# Patient Record
Sex: FEMALE | Employment: OTHER | URBAN - METROPOLITAN AREA
[De-identification: names, ages, dates, MRNs, and addresses within clinical notes are randomized per-mention and may not be internally consistent; named-entity substitution may affect disease eponyms.]

---

## 2018-03-01 ENCOUNTER — OFFICE VISIT (OUTPATIENT)
Dept: URGENT CARE | Facility: CLINIC | Age: 68
End: 2018-03-01

## 2018-03-01 VITALS
WEIGHT: 110.25 LBS | RESPIRATION RATE: 18 BRPM | OXYGEN SATURATION: 99 % | TEMPERATURE: 100 F | HEART RATE: 97 BPM | DIASTOLIC BLOOD PRESSURE: 71 MMHG | BODY MASS INDEX: 22.23 KG/M2 | HEIGHT: 59 IN | SYSTOLIC BLOOD PRESSURE: 135 MMHG

## 2018-03-01 DIAGNOSIS — J10.1 INFLUENZA B: Primary | ICD-10-CM

## 2018-03-01 DIAGNOSIS — R50.9 FEVER, UNSPECIFIED FEVER CAUSE: ICD-10-CM

## 2018-03-01 LAB
CTP QC/QA: YES
FLUAV AG NPH QL: NEGATIVE
FLUBV AG NPH QL: POSITIVE

## 2018-03-01 PROCEDURE — 87804 INFLUENZA ASSAY W/OPTIC: CPT | Mod: QW,S$GLB,, | Performed by: NURSE PRACTITIONER

## 2018-03-01 PROCEDURE — 99203 OFFICE O/P NEW LOW 30 MIN: CPT | Mod: S$GLB,,, | Performed by: NURSE PRACTITIONER

## 2018-03-01 RX ORDER — OSELTAMIVIR PHOSPHATE 75 MG/1
75 CAPSULE ORAL 2 TIMES DAILY
Qty: 10 CAPSULE | Refills: 0 | Status: SHIPPED | OUTPATIENT
Start: 2018-03-01 | End: 2018-03-06

## 2018-03-01 RX ORDER — ACETAMINOPHEN 500 MG
1000 TABLET ORAL ONCE
Status: COMPLETED | OUTPATIENT
Start: 2018-03-01 | End: 2018-03-01

## 2018-03-01 RX ORDER — ONDANSETRON 4 MG/1
TABLET, ORALLY DISINTEGRATING ORAL
Qty: 10 TABLET | Refills: 0 | Status: SHIPPED | OUTPATIENT
Start: 2018-03-01

## 2018-03-01 RX ADMIN — Medication 1000 MG: at 07:03

## 2018-03-02 NOTE — PATIENT INSTRUCTIONS
Please drink plenty of fluids.  Please get plenty of rest.  Please return here or go to the Emergency Department for any concerns or worsening of condition.  Tamiflu prescription has been discussed and if prescribed, please take to completion unless you cannot tolerate the side effects.   If you were prescribed a narcotic medication, do not drive or operate heavy equipment or machinery while taking these medications.  If you do not have Hypertension or any history of palpitations, it is ok to take over the counter Sudafed or Mucinex D or Allegra-D or Claritin-D or Zyrtec-D.  If you do take one of the above, it is ok to combine that with plain over the counter Mucinex or Allegra or Claritin or Zyrtec.  If for example you are taking Zyrtec -D, you can combine that with Mucinex, but not Mucinex-D.  If you are taking Mucinex-D, you can combine that with plain Allegra or Claritin or Zyrtec.   If you do have Hypertension or palpitations, it is safe to take Coricidin HBP for relief of sinus symptoms.  If not allergic, please take over the counter Tylenol (Acetaminophen) and/or Motrin (Ibuprofen) as directed for control of pain and/or fever.  Please follow up with your primary care doctor or specialist as needed.    If you  smoke, please stop smoking.  Grippe (enfant)    La grippe est une maladie virale maru affecte les voies respiratoires lurdes poumons. Nidia diffère du rhume. Et est grandement contagieuse. La grippe peut se répandre dans l'air en toussant et en éternuant, ou par contact direct (en touchant la personne malade, puis en vous touchant les yeux, le kisha ou la bouche). La maladie débute un à trois jours après l'exposition et dure une à deux semaines.  Les symptômes incluent une fatigue extrême, de la fièvre, lurdes douleurs musculaires, lurdes maux de tête et une toux sèche et douloureuse. Les antibiotiques ne sont généralement pas nécessaires, à moins qu'une complication ne se manifeste (comme une infection à l'oreille  ou une pneumonie).  Soins à domicile :  · LIQUIDES : La fièvre augmente la perte d'eau du corps. Pour les nouveau-nés âgés de moins de 1 an, continuez à alimenter régulièrement (préparation ou au sein). Entre les alimentations, donnez de la solution orale de réhydratation (comme Pedialyte, Infalyte, Rehydralyte, que vous pouvez vous procurer dans les épiceries et les pharmacies sans ordonnance). Pour les enfants âgés de plus de 1 an, donnez beaucoup de liquides comme de l'eau, du jus, de l'eau gélifiée, du 7-Up, du soda au gingembre, de la limonade, du Ritesh-Aid ou lurdes glaces à l'eau.  · ALIMENTATION : Si votre enfant ne veut pas manger de nourriture solide, ne gustavo en donnez pas pendant quelques jours, tant qu'il ou justina boit beaucoup de liquides.  · ACTIVITÉ : Gardez votre enfant atteint de fièvre à la jose eduardo pour qu'il se repose ou joue paisiblement. Encouragez-le à faire lurdes siestes de manière fréquente. Votre enfant peut retourner en garde de jour ou à l'école quand la fièvre a disparu pendant au moins 24 heures et que l'enfant mange lyndsay et se sent mieux.  · SOMMEIL : Les périodes d'insomnie et d'irritabilité sont fréquentes. Un enfant congestionné dormira mieux avec la tête et la partie supérieure du corps élevées par lurdes oreillers, ou avec la tête du lit élevée par lurdes blocs de 6 pouces (10 cm). Un nouveau-né peut dormir dans un siège de voiture posé kings le lit.  · TOUX : Tousser est normal en demetrice de grippe. Un humidificateur à brume fraîche placé près du lit peut être utile. Il n'a pas été prouvé que les médicaments contre le rhume et contre la toux en vente mary sont plus utiles qu'un placébo (sirop sucré sans médicament). Cependant, ils peuvent produit lurdes effets secondaires sérieux, en particulier chez les nouveau-nés âgés de moins de 2 ans. Par conséquent, ne donnez pas de médicaments contre le rhume et contre la toux en vente mary à un enfant âgé de moins de 6 ans, à moins que votre médecin vous ait  spécifiquement conseillé de le faire. De même, n'exposez pas votre enfant à la fumée de cigarette. Kartik aggraverait sa toux.  · CONGESTION NASALE : Aspirez le contenu du kisha lurdes nouveau-nés avec une seringue dotée d'une poire en caoutchouc. Spruce d'aspirer, vous pouvez faire couler 2 à 3 gouttes d'eau salée (solution saline) pour le kisha dans chaque arleth afin d'éliminer les sécrétions. Les gouttes d'eau saline pour le kisha sont disponibles sans prescription. Vous pouvez en faire en ajoutant 1/4 de cuillère à thé de jayla de table dans 1 tasse d'eau.  · FIÈVRE : Utilisez de l'acétaminophène (Tylenol) pour contrôler la douleur, à moins qu'un autre médicament vous ait été prescrit. Avec les nouveau-nés âgés de plus de 6 mois, vous pouvez utiliser de l'ibuprofène (Motrin pour enfant) au lieu du Tylenol. [REMARQUE : si votre enfant a une maladie chronique du foie ou lurdes reins, ou s'il a déjà eu un ulcère à l'estomac ou un saignement gastro-intestinal, parlez avec votre médecin jeremie d'utiliser saqib médicaments.] (Ne jamais preet d'aspirine à une personne âgée de moins de 18 ans maru a de la fièvre. Kartik pourrait causer lurdes dommages graves au foie.)  Effectuez un suivi  comme indiqué par notre personnel.  Obtenez de l'aide médicale dans les plus brefs délais  si ce maru suit se produit :  · Fièvre de 100,4 °F (38 °C) orale ou 101,4 °F (38,5 °C) rectale, ou supérieure, sans amélioration après prise de médicament contre la fièvre  · Respiration rapide (6 semaines à 2 ans : plus de 45 respirations par minute; 3 à 6 ans : plus de 35 respirations par minute; 7 à 10 ans : plus de 35 respirations par minute; plus de 10 ans : plus de 25 respirations par minute)  · Maux d'oreille, douleur aux sinus, cou raide ou douloureux, maux de tête, diarrhée ou vomissement à répétition  · Comportement difficile, somnolence ou confusion inhabituels  · Pas de larmes pendant les pleurs, yeux « enfoncés » ou bouche sèche; pas de couches sèches  pendant 8 heures chez les nouveau-nés, production d'urine réduite chez les enfants plus âgés  · Apparition d'un rash  Date Last Reviewed: 12/23/2014  © 7735-5013 [employer]. [address] [place] [id number]. All rights reserved. This information is not intended as a substitute for professional medical care. Always follow your healthcare professional's instructions.        Maladie Respiratoire Virale [Adulte]  Vous avez un maladie lurdes voies respiratoires supérieures provoquée par un virus. Cette maladie est contagieuse pendant les premiers jours. Nidia se diffuse dans l'air par la toux et les éternuements ou par contact direct (toucher la personne malade et ensuite toucher malcolm yeux, votre kisha ou votre bouche). La plupart lurdes maladies virales disparaissent en 7 à 10 jours avec du repos et de simples remèdes jose eduardo. Parfois, la maladie peut durer plusieurs semaines. Les antibiotiques ne peuvent pas tuer un virus et ils ne sont généralement pas prescrits pour cette maladie.    Soins A Domicile :  1) Si les symptômes sont graves, reposez-vous à la jose eduardo les 2 à 3 premiers jours. Lorsque vous reprenez votre activité, ne vous fatiguez pas trop.  2) Évitez toute exposition à la fumée de cigarette (la vôtre ou celle lurdes autres).  3) Du Tylenol (acétaminophène) ou de l'ibuprofène (Advil, Motrin) vont soulager la fièvre, les douleurs musculaires et les céphalées. (Les personnes de moins de 18 ans maru ont de la fièvre ne devraient pas prendre d'aspirine car donnie risque de détériorer leur foie.)  4) Votre appétit peut être faible, c'est pourquoi une alimentation légère convient très lyndsay. Évitez toute déshydratation en buvant 6 à 8 verres de fluides par jour (eau, sodas, jus, thé, soupe, etc.). Les fluides supplémentaires contribuent à fluidifier les sécrétions dans le kisha et dans les poumons.  5) Les médicaments contre le rhume vendus sans ordonnance ne vont pas diminuer la durée de votre maladie, mais  ils peuvent contribuer à soulager les symptômes suivants : toux (Robitussin DM) ; mal de gorge (Chloraseptic en losanges ou en spray) ; congestion nasale et congestion lurdes sinus (Actifed, Sudafed, Chlortrimeton).  Realisez Le Suivi  auprès de votre médecin ou selon les instructions si votre état ne s'améliore pas la semaine prochaine.  Consultez Rapidement Un Medecin  si l'un lurdes symptômes suivants apparaît :  -- Toux avec beaucoup d'expectorations colorées (mucus) ou sang dans malcolm expectorations  -- Douleur thoracique, essoufflement, sifflement pendant la respiration ou difficultés respiratoires  -- Céphalées graves ; douleur au visage, dans le cou ou l'oreille  -- Fièvre de plus de 100,4º F (38,0º C) pendant plus de trois jours  -- Vous ne pouvez pas avaler en raison d'une douleur à la gorge  Date Last Reviewed: 9/13/2015  © 6454-9899 [employer]. [address] [place] [address], [place] [address]. All rights reserved. This information is not intended as a substitute for professional medical care. Always follow your healthcare professional's instructions.

## 2018-03-02 NOTE — PROGRESS NOTES
"Subjective:       Patient ID: Dolores Hameed is a 67 y.o. female.    Vitals:  height is 4' 11" (1.499 m) and weight is 50 kg (110 lb 3.7 oz). Her oral temperature is 99.7 °F (37.6 °C). Her blood pressure is 135/71 and her pulse is 97. Her respiration is 18 and oxygen saturation is 99%.     Chief Complaint: Fever    Pt here from out of town for c/o fever, headache, body aches x2 days.  Some sore throat and cough.  Mainly c/o fever and headache.  No neck pain.  She does not travel home until 3/11/18.      Fever    This is a new problem. The current episode started in the past 7 days (2 days ago). The problem occurs daily. The problem has been gradually worsening. Her temperature was unmeasured prior to arrival. Associated symptoms include coughing, headaches, muscle aches and a sore throat. Pertinent negatives include no abdominal pain, chest pain, congestion, diarrhea, ear pain, nausea, sleepiness, vomiting or wheezing. She has tried acetaminophen for the symptoms. The treatment provided mild relief.     Review of Systems   Constitution: Positive for fever. Negative for chills and malaise/fatigue.   HENT: Positive for sore throat. Negative for congestion, ear pain and hoarse voice.    Eyes: Negative for discharge and redness.   Cardiovascular: Negative for chest pain, dyspnea on exertion and leg swelling.   Respiratory: Positive for cough. Negative for shortness of breath, sputum production and wheezing.    Musculoskeletal: Negative for myalgias.   Gastrointestinal: Negative for abdominal pain, diarrhea, nausea and vomiting.   Neurological: Positive for headaches.       Objective:      Physical Exam   Constitutional: She is oriented to person, place, and time. She appears well-developed and well-nourished. She is cooperative.  Non-toxic appearance. She does not have a sickly appearance. She does not appear ill. No distress.   HENT:   Head: Normocephalic and atraumatic.   Right Ear: Hearing, tympanic membrane, " external ear and ear canal normal.   Left Ear: Hearing, tympanic membrane, external ear and ear canal normal.   Nose: Rhinorrhea (clear) present. No mucosal edema or nasal deformity. No epistaxis. Right sinus exhibits no maxillary sinus tenderness and no frontal sinus tenderness. Left sinus exhibits no maxillary sinus tenderness and no frontal sinus tenderness.   Mouth/Throat: Uvula is midline, oropharynx is clear and moist and mucous membranes are normal. No trismus in the jaw. Normal dentition. No uvula swelling. No oropharyngeal exudate, posterior oropharyngeal edema or posterior oropharyngeal erythema.   Eyes: Conjunctivae and lids are normal. No scleral icterus.   Sclera clear bilat   Neck: Trachea normal, full passive range of motion without pain and phonation normal. Neck supple. No neck rigidity.   Cardiovascular: Normal rate, regular rhythm, normal heart sounds and normal pulses.    Pulmonary/Chest: Effort normal and breath sounds normal. No respiratory distress.   Abdominal: Soft. Normal appearance and bowel sounds are normal. She exhibits no distension. There is no tenderness.   Musculoskeletal: Normal range of motion. She exhibits no edema or deformity.   Lymphadenopathy:     She has no cervical adenopathy.   Neurological: She is alert and oriented to person, place, and time. She exhibits normal muscle tone. Coordination normal.   Skin: Skin is warm, dry and intact. She is not diaphoretic. No pallor.   Psychiatric: She has a normal mood and affect. Her speech is normal and behavior is normal. Judgment and thought content normal. Cognition and memory are normal.   Nursing note and vitals reviewed.      Results for orders placed or performed in visit on 03/01/18   POCT Influenza A/B   Result Value Ref Range    Rapid Influenza A Ag Negative Negative    Rapid Influenza B Ag Positive (A) Negative     Acceptable Yes      Assessment:       1. Influenza B    2. Fever, unspecified fever cause         Plan:         Influenza B  -     oseltamivir (TAMIFLU) 75 MG capsule; Take 1 capsule (75 mg total) by mouth 2 (two) times daily.  Dispense: 10 capsule; Refill: 0  -     ondansetron (ZOFRAN-ODT) 4 MG TbDL; One tablet sublingual tid prn nausea  Dispense: 10 tablet; Refill: 0    Fever, unspecified fever cause  -     POCT Influenza A/B  -     acetaminophen tablet 1,000 mg; Take 2 tablets (1,000 mg total) by mouth once.      Patient Instructions   Please drink plenty of fluids.  Please get plenty of rest.  Please return here or go to the Emergency Department for any concerns or worsening of condition.  Tamiflu prescription has been discussed and if prescribed, please take to completion unless you cannot tolerate the side effects.   If you were prescribed a narcotic medication, do not drive or operate heavy equipment or machinery while taking these medications.  If you do not have Hypertension or any history of palpitations, it is ok to take over the counter Sudafed or Mucinex D or Allegra-D or Claritin-D or Zyrtec-D.  If you do take one of the above, it is ok to combine that with plain over the counter Mucinex or Allegra or Claritin or Zyrtec.  If for example you are taking Zyrtec -D, you can combine that with Mucinex, but not Mucinex-D.  If you are taking Mucinex-D, you can combine that with plain Allegra or Claritin or Zyrtec.   If you do have Hypertension or palpitations, it is safe to take Coricidin HBP for relief of sinus symptoms.  If not allergic, please take over the counter Tylenol (Acetaminophen) and/or Motrin (Ibuprofen) as directed for control of pain and/or fever.  Please follow up with your primary care doctor or specialist as needed.    If you  smoke, please stop smoking.  Grippe (enfant)    La grippe est une maladie virale maru affecte les voies respiratoires lurdes poumons. Nidia diffère du rhume. Et est grandement contagieuse. La grippe peut se répandre dans l'air en toussant et en éternuant, ou par  contact direct (en touchant la personne malade, puis en vous touchant les yeux, le kisha ou la bouche). La maladie débute un à trois jours après l'exposition et dure une à deux semaines.  Les symptômes incluent une fatigue extrême, de la fièvre, lurdes douleurs musculaires, lurdes maux de tête et une toux sèche et douloureuse. Les antibiotiques ne sont généralement pas nécessaires, à moins qu'une complication ne se manifeste (comme une infection à l'oreille ou une pneumonie).  Soins à domicile :  · LIQUIDES : La fièvre augmente la perte d'eau du corps. Pour les nouveau-nés âgés de moins de 1 an, continuez à alimenter régulièrement (préparation ou au sein). Entre les alimentations, donnez de la solution orale de réhydratation (comme Pedialyte, Infalyte, Rehydralyte, que vous pouvez vous procurer dans les épiceries et les pharmacies sans ordonnance). Pour les enfants âgés de plus de 1 an, donnez beaucoup de liquides comme de l'eau, du jus, de l'eau gélifiée, du 7-Up, du soda au gingembre, de la limonade, du Ritesh-Aid ou lurdes glaces à l'eau.  · ALIMENTATION : Si votre enfant ne veut pas manger de nourriture solide, ne gustavo en donnez pas pendant quelques jours, tant qu'il ou justina boit beaucoup de liquides.  · ACTIVITÉ : Gardez votre enfant atteint de fièvre à la jose eduardo pour qu'il se repose ou joue paisiblement. Encouragez-le à faire lurdes siestes de manière fréquente. Votre enfant peut retourner en garde de jour ou à l'école quand la fièvre a disparu pendant au moins 24 heures et que l'enfant mange lyndsay et se sent mieux.  · SOMMEIL : Les périodes d'insomnie et d'irritabilité sont fréquentes. Un enfant congestionné dormira mieux avec la tête et la partie supérieure du corps élevées par lurdes oreillers, ou avec la tête du lit élevée par lurdes blocs de 6 pouces (10 cm). Un nouveau-né peut dormir dans un siège de voiture posé kings le lit.  · TOUX : Tousser est normal en demetrice de grippe. Un humidificateur à brume fraîche placé près du lit peut  être utile. Il n'a pas été prouvé que les médicaments contre le rhume et contre la toux en vente mary sont plus utiles qu'un placébo (sirop sucré sans médicament). Cependant, ils peuvent produit lurdes effets secondaires sérieux, en particulier chez les nouveau-nés âgés de moins de 2 ans. Par conséquent, ne donnez pas de médicaments contre le rhume et contre la toux en vente mary à un enfant âgé de moins de 6 ans, à moins que votre médecin vous ait spécifiquement conseillé de le faire. De même, n'exposez pas votre enfant à la fumée de cigarette. Kartik aggraverait sa toux.  · CONGESTION NASALE : Aspirez le contenu du kisha lurdes nouveau-nés avec une seringue dotée d'une poire en caoutchouc. Neihart d'aspirer, vous pouvez faire couler 2 à 3 gouttes d'eau salée (solution saline) pour le kisha dans chaque arleth afin d'éliminer les sécrétions. Les gouttes d'eau saline pour le kisha sont disponibles sans prescription. Vous pouvez en faire en ajoutant 1/4 de cuillère à thé de jayla de table dans 1 tasse d'eau.  · FIÈVRE : Utilisez de l'acétaminophène (Tylenol) pour contrôler la douleur, à moins qu'un autre médicament vous ait été prescrit. Avec les nouveau-nés âgés de plus de 6 mois, vous pouvez utiliser de l'ibuprofène (Motrin pour enfant) au lieu du Tylenol. [REMARQUE : si votre enfant a une maladie chronique du foie ou lurdes reins, ou s'il a déjà eu un ulcère à l'estomac ou un saignement gastro-intestinal, parlez avec votre médecin jeremie d'utiliser saqib médicaments.] (Ne jamais preet d'aspirine à une personne âgée de moins de 18 ans maru a de la fièvre. Kartik pourrait causer lurdes dommages graves au foie.)  Effectuez un suivi  comme indiqué par notre personnel.  Obtenez de l'aide médicale dans les plus brefs délais  si ce maru suit se produit :  · Fièvre de 100,4 °F (38 °C) orale ou 101,4 °F (38,5 °C) rectale, ou supérieure, sans amélioration après prise de médicament contre la fièvre  · Respiration rapide (6 semaines à 2 ans : plus de 45  respirations par minute; 3 à 6 ans : plus de 35 respirations par minute; 7 à 10 ans : plus de 35 respirations par minute; plus de 10 ans : plus de 25 respirations par minute)  · Maux d'oreille, douleur aux sinus, cou raide ou douloureux, maux de tête, diarrhée ou vomissement à répétition  · Comportement difficile, somnolence ou confusion inhabituels  · Pas de larmes pendant les pleurs, yeux « enfoncés » ou bouche sèche; pas de couches sèches pendant 8 heures chez les nouveau-nés, production d'urine réduite chez les enfants plus âgés  · Apparition d'un rash  Date Last Reviewed: 12/23/2014 © 2000-2017 [email or web address]. [address] [place] [id number]. All rights reserved. This information is not intended as a substitute for professional medical care. Always follow your healthcare professional's instructions.        Maladie Respiratoire Virale [Adulte]  Vous avez un maladie lurdes voies respiratoires supérieures provoquée par un virus. Cette maladie est contagieuse pendant les premiers jours. Nidia se diffuse dans l'air par la toux et les éternuements ou par contact direct (toucher la personne malade et ensuite toucher malcolm yeux, votre kisha ou votre bouche). La plupart lurdes maladies virales disparaissent en 7 à 10 jours avec du repos et de simples remèdes jose eduardo. Parfois, la maladie peut durer plusieurs semaines. Les antibiotiques ne peuvent pas tuer un virus et ils ne sont généralement pas prescrits pour cette maladie.    Soins A Domicile :  1) Si les symptômes sont graves, reposez-vous à la jose eduardo les 2 à 3 premiers jours. Lorsque vous reprenez votre activité, ne vous fatiguez pas trop.  2) Évitez toute exposition à la fumée de cigarette (la vôtre ou celle lurdes autres).  3) Du Tylenol (acétaminophène) ou de l'ibuprofène (Advil, Motrin) vont soulager la fièvre, les douleurs musculaires et les céphalées. (Les personnes de moins de 18 ans maru ont de la fièvre ne devraient pas prendre d'aspirine car donnie  risque de détériorer leur foie.)  4) Votre appétit peut être faible, c'est pourquoi une alimentation légère convient très lyndsay. Évitez toute déshydratation en buvant 6 à 8 verres de fluides par jour (eau, sodas, jus, thé, soupe, etc.). Les fluides supplémentaires contribuent à fluidifier les sécrétions dans le kisha et dans les poumons.  5) Les médicaments contre le rhume vendus sans ordonnance ne vont pas diminuer la durée de votre maladie, mais ils peuvent contribuer à soulager les symptômes suivants : toux (Robitussin DM) ; mal de gorge (Chloraseptic en losanges ou en spray) ; congestion nasale et congestion lurdes sinus (Actifed, Sudafed, Chlortrimeton).  Realisez Le Suivi  auprès de votre médecin ou selon les instructions si votre état ne s'améliore pas la semaine prochaine.  Consultez Rapidement Un Medecin  si l'un lurdes symptômes suivants apparaît :  -- Toux avec beaucoup d'expectorations colorées (mucus) ou sang dans malcolm expectorations  -- Douleur thoracique, essoufflement, sifflement pendant la respiration ou difficultés respiratoires  -- Céphalées graves ; douleur au visage, dans le cou ou l[facility] [place] [facility]  -- Fièvre de plus de 100,4º F (38,0º C) pendant plus de trois jours  -- Vous ne pouvez pas avaler en raison d'une douleur à la gorge  Date Last Reviewed: 9/13/2015  © 8342-3452 [employer]. [address] [place] [address], [place] [address]. All rights reserved. This information is not intended as a substitute for professional medical care. Always follow your healthcare professional's instructions.